# Patient Record
Sex: FEMALE | Race: WHITE | NOT HISPANIC OR LATINO | Employment: FULL TIME | URBAN - METROPOLITAN AREA
[De-identification: names, ages, dates, MRNs, and addresses within clinical notes are randomized per-mention and may not be internally consistent; named-entity substitution may affect disease eponyms.]

---

## 2022-01-05 ENCOUNTER — OFFICE VISIT (OUTPATIENT)
Dept: URGENT CARE | Facility: CLINIC | Age: 37
End: 2022-01-05
Payer: COMMERCIAL

## 2022-01-05 VITALS
WEIGHT: 190 LBS | SYSTOLIC BLOOD PRESSURE: 112 MMHG | RESPIRATION RATE: 16 BRPM | HEIGHT: 63 IN | TEMPERATURE: 97 F | DIASTOLIC BLOOD PRESSURE: 60 MMHG | BODY MASS INDEX: 33.66 KG/M2 | OXYGEN SATURATION: 100 % | HEART RATE: 69 BPM

## 2022-01-05 DIAGNOSIS — G44.219 EPISODIC TENSION-TYPE HEADACHE, NOT INTRACTABLE: ICD-10-CM

## 2022-01-05 DIAGNOSIS — M54.2 SORE NECK: Primary | ICD-10-CM

## 2022-01-05 LAB
CTP QC/QA: YES
MOLECULAR STREP A: NEGATIVE
POC MOLECULAR INFLUENZA A AGN: NEGATIVE
POC MOLECULAR INFLUENZA B AGN: NEGATIVE
SARS-COV-2 RDRP RESP QL NAA+PROBE: NEGATIVE

## 2022-01-05 PROCEDURE — 96372 THER/PROPH/DIAG INJ SC/IM: CPT | Mod: S$GLB,,, | Performed by: PHYSICIAN ASSISTANT

## 2022-01-05 PROCEDURE — 87651 POCT STREP A MOLECULAR: ICD-10-PCS | Mod: QW,S$GLB,, | Performed by: PHYSICIAN ASSISTANT

## 2022-01-05 PROCEDURE — 87651 STREP A DNA AMP PROBE: CPT | Mod: QW,S$GLB,, | Performed by: PHYSICIAN ASSISTANT

## 2022-01-05 PROCEDURE — 3078F PR MOST RECENT DIASTOLIC BLOOD PRESSURE < 80 MM HG: ICD-10-PCS | Mod: CPTII,S$GLB,, | Performed by: PHYSICIAN ASSISTANT

## 2022-01-05 PROCEDURE — 3078F DIAST BP <80 MM HG: CPT | Mod: CPTII,S$GLB,, | Performed by: PHYSICIAN ASSISTANT

## 2022-01-05 PROCEDURE — 3074F SYST BP LT 130 MM HG: CPT | Mod: CPTII,S$GLB,, | Performed by: PHYSICIAN ASSISTANT

## 2022-01-05 PROCEDURE — 99214 OFFICE O/P EST MOD 30 MIN: CPT | Mod: 25,S$GLB,, | Performed by: PHYSICIAN ASSISTANT

## 2022-01-05 PROCEDURE — 1160F RVW MEDS BY RX/DR IN RCRD: CPT | Mod: CPTII,S$GLB,, | Performed by: PHYSICIAN ASSISTANT

## 2022-01-05 PROCEDURE — 1160F PR REVIEW ALL MEDS BY PRESCRIBER/CLIN PHARMACIST DOCUMENTED: ICD-10-PCS | Mod: CPTII,S$GLB,, | Performed by: PHYSICIAN ASSISTANT

## 2022-01-05 PROCEDURE — U0002: ICD-10-PCS | Mod: QW,S$GLB,, | Performed by: PHYSICIAN ASSISTANT

## 2022-01-05 PROCEDURE — 1159F PR MEDICATION LIST DOCUMENTED IN MEDICAL RECORD: ICD-10-PCS | Mod: CPTII,S$GLB,, | Performed by: PHYSICIAN ASSISTANT

## 2022-01-05 PROCEDURE — 3008F BODY MASS INDEX DOCD: CPT | Mod: CPTII,S$GLB,, | Performed by: PHYSICIAN ASSISTANT

## 2022-01-05 PROCEDURE — 96372 PR INJECTION,THERAP/PROPH/DIAG2ST, IM OR SUBCUT: ICD-10-PCS | Mod: S$GLB,,, | Performed by: PHYSICIAN ASSISTANT

## 2022-01-05 PROCEDURE — 3008F PR BODY MASS INDEX (BMI) DOCUMENTED: ICD-10-PCS | Mod: CPTII,S$GLB,, | Performed by: PHYSICIAN ASSISTANT

## 2022-01-05 PROCEDURE — 1159F MED LIST DOCD IN RCRD: CPT | Mod: CPTII,S$GLB,, | Performed by: PHYSICIAN ASSISTANT

## 2022-01-05 PROCEDURE — 3074F PR MOST RECENT SYSTOLIC BLOOD PRESSURE < 130 MM HG: ICD-10-PCS | Mod: CPTII,S$GLB,, | Performed by: PHYSICIAN ASSISTANT

## 2022-01-05 PROCEDURE — 87502 INFLUENZA DNA AMP PROBE: CPT | Mod: QW,S$GLB,, | Performed by: PHYSICIAN ASSISTANT

## 2022-01-05 PROCEDURE — 87502 POCT INFLUENZA A/B MOLECULAR: ICD-10-PCS | Mod: QW,S$GLB,, | Performed by: PHYSICIAN ASSISTANT

## 2022-01-05 PROCEDURE — U0002 COVID-19 LAB TEST NON-CDC: HCPCS | Mod: QW,S$GLB,, | Performed by: PHYSICIAN ASSISTANT

## 2022-01-05 PROCEDURE — 99214 PR OFFICE/OUTPT VISIT, EST, LEVL IV, 30-39 MIN: ICD-10-PCS | Mod: 25,S$GLB,, | Performed by: PHYSICIAN ASSISTANT

## 2022-01-05 RX ORDER — KETOROLAC TROMETHAMINE 30 MG/ML
30 INJECTION, SOLUTION INTRAMUSCULAR; INTRAVENOUS
Status: COMPLETED | OUTPATIENT
Start: 2022-01-05 | End: 2022-01-05

## 2022-01-05 RX ORDER — ESZOPICLONE 3 MG/1
3 TABLET, FILM COATED ORAL DAILY
COMMUNITY
Start: 2021-12-21

## 2022-01-05 RX ORDER — BUSPIRONE HYDROCHLORIDE 7.5 MG/1
7.5 TABLET ORAL 2 TIMES DAILY PRN
COMMUNITY
Start: 2021-07-26

## 2022-01-05 RX ORDER — ASPIRIN 325 MG
50000 TABLET, DELAYED RELEASE (ENTERIC COATED) ORAL
COMMUNITY
Start: 2021-11-22

## 2022-01-05 RX ORDER — FOLIC ACID 1 MG/1
1000 TABLET ORAL DAILY
COMMUNITY
Start: 2021-12-21

## 2022-01-05 RX ORDER — CYCLOBENZAPRINE HCL 5 MG
5 TABLET ORAL 2 TIMES DAILY PRN
Qty: 14 TABLET | Refills: 0 | Status: SHIPPED | OUTPATIENT
Start: 2022-01-05 | End: 2022-01-12

## 2022-01-05 RX ORDER — BUPROPION HYDROCHLORIDE 150 MG/1
150 TABLET ORAL EVERY MORNING
COMMUNITY
Start: 2021-12-21

## 2022-01-05 RX ORDER — CETIRIZINE HYDROCHLORIDE 10 MG/1
10 TABLET ORAL
COMMUNITY

## 2022-01-05 RX ADMIN — KETOROLAC TROMETHAMINE 30 MG: 30 INJECTION, SOLUTION INTRAMUSCULAR; INTRAVENOUS at 10:01

## 2022-01-05 NOTE — PATIENT INSTRUCTIONS
Results for orders placed or performed in visit on 01/05/22   POCT COVID-19 Rapid Screening   Result Value Ref Range    POC Rapid COVID Negative Negative     Acceptable Yes    POCT Influenza A/B MOLECULAR   Result Value Ref Range    POC Molecular Influenza A Ag Negative Negative, Not Reported    POC Molecular Influenza B Ag Negative Negative, Not Reported     Acceptable Yes    POCT Strep A, Molecular   Result Value Ref Range    Molecular Strep A, POC Negative Negative     Acceptable Yes    Headache     - Drink plenty of fluids to remain hydrated.   - You can take Tylenol and Ibuprofen as needed for headache, as long as you don't have any allergies to these medications or medical conditions such as ulcers, liver or kidney disease or blood thinners etc that would prevent you from taking these meds.   - Get plenty rest.   - Slowly change from laying, sitting, and standing positions to help lightheadedness/dizziness.  - Sit or lie down immediately when dizziness/lightheaded to avoid further injury.      - If your condition worsens or fails to improve we recommend that you receive another evaluation at the ER immediately or contact your PCP to discuss your concerns or return here.  -  You must understand that you've received an urgent care treatment only and that you may be released before all your medical problems are known or treated. You the patient [facility] instructed.      - Watch for increase pain, fever, vomiting, neck pain or mental confusion.      Neck pain  Please avoid heavy lifting and strenuous exercise for the next several days.      Alternate heat and ice for first 48 hours then  apply heat. You may do gently stretching within your limits of pain.  *Moist warm compresss to area several times daily.  May use a heating pad on LOW to provide heat over a towel which was dampended with warm water.   DO NOT FALL ASLEEP WITH HEATING PAD ON.  Do  not stay in one position to long.  When sleeping on your back place a pillow under knees to reduce tension on back.    If sleeping on your side, place pillow between knees to keep spine in better alinement.    Wear supportive shoes such as tennis shoes for support of the lower back.  Take any medication as directed.      30 mg Toradol injection given in clinic today.  Patient denies any kidney, liver, or GI issues.    You have been prescribed Flexeril for muscle pain.  This medication causes drowsiness.  Do not drink alcohol or operate motor vehicles while taking.        If you were not prescribed an anti-inflammatory medication, and if you do not have any history of stomach/intestinal ulcers, or kidney disease, or are not taking a blood thinner such as Coumadin, Plavix, Pradaxa, Eloquis, or Xaralta for example, it is OK to take over the counter Ibuprofen or Advil or Motrin or Aleve as directed.  Do not take these medications on an empty stomach.      Please follow-up with your primary care provider if your symptoms continue.    Go to the emergency department for any new or worsening symptoms including but not limited to: loss of control of your bowel and/or bladder, sensation loss in between your legs by your genitalia and/or rectum.     OVER THE COUNTER RECOMMENDATIONS/SUGGESTIONS.--if needed  Remember to switch antihistamines every 3 months, if taken daily.     Make sure to stay well hydrated.    Use Nasal Saline to mechanically move any post nasal drip from your eustachian tube or from the back of your throat.    Use warm salt water gargles to ease your throat pain. Warm salt water gargles as needed for sore throat-  1/2 tsp salt to 1 cup warm water, gargle as desired.    Use an antihistamine such as Claritin, Zyrtec or Allegra to dry you out (NONDROWSY) or Benadryl (DROWSY).    Use pseudoephedrine (behind the counter) to decongest. Pseudoephedrine  30 mg up to 240 mg /day. *BE AWARE- It can raise your blood  pressure and give you palpitations.    Use mucinex (guaifenisin) to break up mucous up to 2400mg/day to loosen any mucous.   The mucinex DM pill has a cough suppressant that can be sedating. It can be used at night to stop the tickle at the back of your throat.  You can use Mucinex D (it has guaifenesin and a high dose of pseudoephedrine) in the mornings to help decongest.      Use Flonase 1-2 sprays/nostril per day. It is a local acting steroid nasal spray, if you develop a bloody nose, stop using the medication immediately.    Sometimes Nyquil at night is beneficial to help you get some rest, however it is sedating and it does have an antihistamine, and tylenol.    Honey is a natural cough suppressant that can be used.    Tylenol up to 4,000 mg a day is safe for short periods and can be used for headache, body aches, pain, and fever. However in high doses and prolonged use it can cause liver irritation.    Ibuprofen is a non-steroidal anti-inflammatory that can be used for headache, body aches, pain, and fever.However it can also cause stomach irritation if over used.      - You must understand that you have received an Urgent Care treatment only and that you may be released before all of your medical problems are known or treated.   - You, the patient, will arrange for follow up care as instructed with your primary care provider or recommended specialist.   - If your condition worsens or fails to improve we recommend that you receive another evaluation at the ER immediately or contact your PCP to discuss your concerns, or return here.   - Please do not drive or make any important decisions for 24 hours if you have received any pain medications, sedatives or mood altering drugs during your visit.    Disclaimer: This document was drafted with the use of a voice recognition device and is likely to have sound alike errors.   Patient Education       Generalized Neck Pain   About this topic   The neck or cervical spine  has 7 spinal bones that run from the base of your skull to the upper back. These spinal bones have discs in between them. Discs act as shock absorbers. Ligaments are strong bands of tissue that hold the bones together. Many muscles surround and attach on these bones. Nerves come off of the spinal cord and exit out of small spaces in between the spinal bones. If any of these structures get injured or damaged, neck pain can happen.       What are the causes?   · Problems with muscles, ligaments, and nerve like:  ? Muscle spasm or strain  ? Stretching or tearing of tissue that holds spinal bones together. This is a ligament sprain.  ? Nerve compression  · Problems with bones or discs in your neck like:  ? Discs weaken and collapse. This is disc degeneration.   ? Discs bulge or break open. This is a herniated disc.  ? Bone breaks  ? Trauma, direct blow, whiplash  ? Born with problems in the spinal bones  ? Decreased space where the nerves leave the spinal column. This is spinal stenosis.  · Other problems like:  ? Arthritis  ? Fibromyalgia  ? Infection  ? Tumors or cancer  What can make this more likely to happen?   Neck pain is more likely to happen if you have had a previous neck problem or have been in an accident. People who play contact sports are more likely to have neck pain. You are also more likely if you have poor posture. As your age increases, so does your chances for having disc problems, weak bones, or arthritis.  What are the main signs?   · Pain in the neck that may also run down the arm  · Neck stiffness  · Trouble moving the neck  · Muscle spasms  · Numbness or weakness in the arms  How does the doctor diagnose this health problem?   The doctor will feel around your neck. Your doctor will have you move your neck in different directions to check your motion. Your doctor may push or pull on your neck and arms to check your strength. Your doctor may also check for numbness in your arms. The doctor may  order:  · Lab tests  · X-rays  · CT or MRI scan  · Nerve conduction test  · Electromyelogram (EMG) ? to look at how well the nerves are working  · Spinal tap to check for infection  How does the doctor treat this health problem?   · Rest  · Ice  · Soft neck collar for a short time only. Wearing a neck collar too long can cause weakness in the neck muscles.  · Special pillow  · Heat may be used later but not right away. Heat can make swelling worse.  · Exercises for range of motion, stretching, and strengthening  · Massage  · Traction  · Surgery  What drugs may be needed?   The doctor may order drugs to:  · Help with pain and swelling  · Relax muscles  · Fight an infection  The doctor may give you a shot of an anti-inflammatory drug called a corticosteroid. This will help with swelling. Talk with your doctor about the risks of this shot.  What problems could happen?   · Infection  · Bleeding  · Injury to nerves, tendons, or blood vessels  · Ongoing pain  · Blood clots  · Numbness, tingling, or weakness in the arms or legs  · Arthritis  · Loss of bladder or bowel control  · Paralysis  What can be done to prevent this health problem?   · Always wear a seat belt. Drive safely. Obey speed limits. Do not drink and drive.  · Have headrests in the car at the right height. The middle of the headrest should be even with the upper parts of your ears.  · Use good posture. Do not slouch.  · Take breaks often when doing things that use repeat movements.  · If you have a desk job, make sure your computer is at eye level and that you have a supportive chair. Read papers at eye level.  · If you use the telephone often for your job, use a headset if possible. Do not hold the phone between your ear and shoulder.  · Stay active and work out to keep your muscles strong and flexible.  · Warm up slowly and stretch before you work out. Use good ways to train, such as slowly adding to how far you run. Do not work out if you are overly  tired. Take extra care if working out in cold weather.  · Wear the right equipment when playing sports.  · Always wear helmets for bikes and motorcycles.  Where can I learn more?   [employer]  https://www.betterhealth.[place].gov.[place]/health/ConditionsAndTreatments/neck-pain   NHS Choices  https://www.nhs.uk/conditions/neck-pain-and-stiff-neck/   Last Reviewed Date   2021-09-01  Consumer Information Use and Disclaimer   This information is not specific medical advice and does not replace information you receive from your health care provider. This is only a brief summary of general information. It does NOT include all information about conditions, illnesses, injuries, tests, procedures, treatments, therapies, discharge instructions or life-style choices that may apply to you. You must talk with your health care provider for complete information about your health and treatment options. This information should not be used to decide whether or not to accept your health care providers advice, instructions or recommendations. Only your health care provider has the knowledge and training to provide advice that is right for you.  Copyright   Copyright © 2021 [employer] and its affiliates and/or licensors. All rights reserved.

## 2022-01-05 NOTE — LETTER
[id number] [facility], [address]  [place] [address]  Phone: [phone]          Return to Work/School    Patient: Myra Howard  YOB: 1985   Date: 01/05/2022     To Whom It May Concern:     Myra Howard was in contact with/seen in my office on 01/05/2022. COVID-19 is present in our communities across the state. There is limited testing for COVID at this time, so not all patients can be tested. In this situation, your employee meets the following criteria:     Myra Howard has met the criteria for COVID-19 testing and has a NEGATIVE result.  She also tested negative for flu and strep today.  The employee can return to work once they are asymptomatic for 24 hours without the use of fever reducing medications (Tylenol, Motrin, etc).     If you have any questions or concerns, or if I can be of further assistance, please do not hesitate to contact me.     Sincerely,    Radha Romero PA-C

## 2022-01-05 NOTE — PROGRESS NOTES
"Subjective:       Patient ID: Myra Howard is a 36 y.o. female.    Vitals:  height is 5' 3" (1.6 m) and weight is 86.2 kg (190 lb). Her temperature is 97.2 °F (36.2 °C). Her blood pressure is 112/60 and her pulse is 69. Her respiration is 16 and oxygen saturation is 100%.     Chief Complaint: Neck Pain    Pt states pain at base of skull. Painful to open mouth with stiffness. She states that symptoms started Friday 12/31/2021.  Patient is requesting COVID, flu, and strep tests today.    Neck Pain   Associated symptoms include headaches. Pertinent negatives include no chest pain, numbness, photophobia or trouble swallowing.       Constitution: Negative for chills, fatigue, generalized weakness and international travel in last 60 days.   HENT: Negative for ear pain, tinnitus, drooling, tongue pain, facial swelling, congestion, sore throat, trouble swallowing and voice change.    Neck: Positive for neck pain. Negative for neck stiffness and neck swelling.   Cardiovascular: Negative for chest pain and palpitations.   Eyes: Negative for eye pain, photophobia and vision loss.   Respiratory: Negative for chest tightness, cough, shortness of breath and wheezing.    Gastrointestinal: Negative for abdominal pain, nausea, vomiting, constipation and diarrhea.   Genitourinary: Negative for dysuria and hematuria.   Musculoskeletal: Negative for pain and back pain.   Skin: Negative for rash, erythema and bruising.   Neurological: Positive for headaches. Negative for dizziness, light-headedness, speech difficulty, altered mental status, loss of consciousness, numbness and tingling.   Psychiatric/Behavioral: Negative for altered mental status.       Objective:       Vitals:    01/05/22 0913   BP: 112/60   Pulse: 69   Resp: 16   Temp: 97.2 °F (36.2 °C)   SpO2: 100%   Weight: 86.2 kg (190 lb)   Height: 5' 3" (1.6 m)       Physical Exam   Constitutional: She is oriented to person, place, and time. She appears well-developed. She " is cooperative.  Non-toxic appearance. She does not appear ill. No distress. awake  HENT:   Head: Normocephalic and atraumatic.   Ears:   Right Ear: Hearing, tympanic membrane, external ear and ear canal normal. No drainage, swelling or tenderness. Tympanic membrane is not erythematous and not bulging. No middle ear effusion. No decreased hearing is noted. impacted cerumen  Left Ear: Hearing, tympanic membrane, external ear and ear canal normal. No drainage, swelling or tenderness. Tympanic membrane is not erythematous and not bulging.  No middle ear effusion. No decreased hearing is noted. impacted cerumen  Nose: Congestion present. No mucosal edema, rhinorrhea, purulent discharge or nasal deformity. No epistaxis. Right sinus exhibits no maxillary sinus tenderness and no frontal sinus tenderness. Left sinus exhibits no maxillary sinus tenderness and no frontal sinus tenderness.   Mouth/Throat: Uvula is midline and mucous membranes are normal. Mucous membranes are moist. No oral lesions. No trismus in the jaw. Normal dentition. No uvula swelling. No oropharyngeal exudate, posterior oropharyngeal edema, posterior oropharyngeal erythema, tonsillar abscesses or cobblestoning. Tonsils are 0 on the right. Tonsils are 0 on the left. No tonsillar exudate. Oropharynx is clear.   Eyes: Conjunctivae and lids are normal. Pupils are equal, round, and reactive to light. No visual field deficit is present. Right eye exhibits no discharge. Left eye exhibits no discharge. No scleral icterus. Right eye exhibits no nystagmus. Left eye exhibits no nystagmus.      extraocular movement intact vision grossly intact gaze aligned appropriately periorbital hyperpigmentation   Neck: Trachea normal and phonation normal. Neck supple. Carotid bruit is not present. No Brudzinski's sign and no Kernig's sign noted. No thyroid mass and no thyromegaly present. No thyroid tenderness present. No torticollis present.     No edema present. No erythema  present. No neck rigidity present. No decreased range of motion present. No pain with movement present. No spinous process tenderness present. muscular tenderness present.   Cardiovascular: Normal rate, regular rhythm, S1 normal, S2 normal, normal heart sounds and normal pulses. Exam reveals no decreased pulses.   Pulmonary/Chest: Effort normal and breath sounds normal. No accessory muscle usage. No tachypnea. No respiratory distress. She has no decreased breath sounds. She has no wheezes. She has no rhonchi. She exhibits no tenderness, no crepitus and no swelling.   Abdominal: Normal appearance and bowel sounds are normal. She exhibits no distension, no pulsatile midline mass and no mass. Soft. There is no abdominal tenderness. There is no rebound, no guarding, no left CVA tenderness and no right CVA tenderness.   Musculoskeletal: Normal range of motion.         General: No swelling, deformity or signs of injury. Normal range of motion.      Cervical back: She exhibits tenderness.      Right lower leg: No edema.      Left lower leg: No edema.   Lymphadenopathy:     She has no cervical adenopathy.   Neurological: no focal deficit. She is alert, oriented to person, place, and time and at baseline. She has normal motor skills, normal sensation and intact cranial nerves. She displays no weakness and no dysarthria. No cranial nerve deficit. She exhibits normal muscle tone. Gait normal. Coordination and gait normal. GCS eye subscore is 4. GCS verbal subscore is 5. GCS motor subscore is 6.   Skin: Skin is warm, dry, intact, not diaphoretic, not pale and no rash. Capillary refill takes less than 2 seconds. No erythema and No lesion   Psychiatric: Her speech is normal and behavior is normal. Judgment and thought content normal.   Nursing note and vitals reviewed.        Assessment:       1. Sore neck    2. Episodic tension-type headache, not intractable        Results for orders placed or performed in visit on 01/05/22    POCT COVID-19 Rapid Screening   Result Value Ref Range    POC Rapid COVID Negative Negative     Acceptable Yes    POCT Influenza A/B MOLECULAR   Result Value Ref Range    POC Molecular Influenza A Ag Negative Negative, Not Reported    POC Molecular Influenza B Ag Negative Negative, Not Reported     Acceptable Yes    POCT Strep A, Molecular   Result Value Ref Range    Molecular Strep A, POC Negative Negative     Acceptable Yes        Plan:         Sore neck  -     POCT COVID-19 Rapid Screening  -     POCT Influenza A/B MOLECULAR  -     POCT Strep A, Molecular  -     ketorolac injection 30 mg  -     cyclobenzaprine (FLEXERIL) 5 MG tablet; Take 1 tablet (5 mg total) by mouth 2 (two) times daily as needed for Muscle spasms.  Dispense: 14 tablet; Refill: 0    Episodic tension-type headache, not intractable               Patient Instructions     Results for orders placed or performed in visit on 01/05/22   POCT COVID-19 Rapid Screening   Result Value Ref Range    POC Rapid COVID Negative Negative     Acceptable Yes    POCT Influenza A/B MOLECULAR   Result Value Ref Range    POC Molecular Influenza A Ag Negative Negative, Not Reported    POC Molecular Influenza B Ag Negative Negative, Not Reported     Acceptable Yes    POCT Strep A, Molecular   Result Value Ref Range    Molecular Strep A, POC Negative Negative     Acceptable Yes    Headache     - Drink plenty of fluids to remain hydrated.   - You can take Tylenol and Ibuprofen as needed for headache, as long as you don't have any allergies to these medications or medical conditions such as ulcers, liver or kidney disease or blood thinners etc that would prevent you from taking these meds.   - Get plenty rest.   - Slowly change from laying, sitting, and standing positions to help lightheadedness/dizziness.  - Sit or lie down immediately when dizziness/lightheaded to avoid further  injury.      - If your condition worsens or fails to improve we recommend that you receive another evaluation at the ER immediately or contact your PCP to discuss your concerns or return here.  -  You must understand that you've received an urgent care treatment only and that you may be released before all your medical problems are known or treated. You the patient will arrange for follouwp care as instructed.      - Watch for increase pain, fever, vomiting, neck pain or mental confusion.      Neck pain  Please avoid heavy lifting and strenuous exercise for the next several days.      Alternate heat and ice for first 48 hours then  apply heat. You may do gently stretching within your limits of pain.  *Moist warm compresss to area several times daily.  May use a heating pad on LOW to provide heat over a towel which was dampended with warm water.   DO NOT FALL ASLEEP WITH HEATING PAD ON.  Do not stay in one position to long.  When sleeping on your back place a pillow under knees to reduce tension on back.    If sleeping on your side, place pillow between knees to keep spine in better alinement.    Wear supportive shoes such as tennis shoes for support of the lower back.  Take any medication as directed.      30 mg Toradol injection given in clinic today.  Patient denies any kidney, liver, or GI issues.    You have been prescribed Flexeril for muscle pain.  This medication causes drowsiness.  Do not drink alcohol or operate motor vehicles while taking.        If you were not prescribed an anti-inflammatory medication, and if you do not have any history of stomach/intestinal ulcers, or kidney disease, or are not taking a blood thinner such as Coumadin, Plavix, Pradaxa, Eloquis, or Xaralta for example, it is OK to take over the counter Ibuprofen or Advil or Motrin or Aleve as directed.  Do not take these medications on an empty stomach.      Please follow-up with your primary care provider if your symptoms  continue.    Go to the emergency department for any new or worsening symptoms including but not limited to: loss of control of your bowel and/or bladder, sensation loss in between your legs by your genitalia and/or rectum.     OVER THE COUNTER RECOMMENDATIONS/SUGGESTIONS.--if needed  Remember to switch antihistamines every 3 months, if taken daily.     Make sure to stay well hydrated.    Use Nasal Saline to mechanically move any post nasal drip from your eustachian tube or from the back of your throat.    Use warm salt water gargles to ease your throat pain. Warm salt water gargles as needed for sore throat-  1/2 tsp salt to 1 cup warm water, gargle as desired.    Use an antihistamine such as Claritin, Zyrtec or Allegra to dry you out (NONDROWSY) or Benadryl (DROWSY).    Use pseudoephedrine (behind the counter) to decongest. Pseudoephedrine  30 mg up to 240 mg /day. *BE AWARE- It can raise your blood pressure and give you palpitations.    Use mucinex (guaifenisin) to break up mucous up to 2400mg/day to loosen any mucous.   The mucinex DM pill has a cough suppressant that can be sedating. It can be used at night to stop the tickle at the back of your throat.  You can use Mucinex D (it has guaifenesin and a high dose of pseudoephedrine) in the mornings to help decongest.      Use Flonase 1-2 sprays/nostril per day. It is a local acting steroid nasal spray, if you develop a bloody nose, stop using the medication immediately.    Sometimes Nyquil at night is beneficial to help you get some rest, however it is sedating and it does have an antihistamine, and tylenol.    Honey is a natural cough suppressant that can be used.    Tylenol up to 4,000 mg a day is safe for short periods and can be used for headache, body aches, pain, and fever. However in high doses and prolonged use it can cause liver irritation.    Ibuprofen is a non-steroidal anti-inflammatory that can be used for headache, body aches, pain, and fever.However  it can also cause stomach irritation if over used.      - You must understand that you have received an Urgent Care treatment only and that you may be released before all of your medical problems are known or treated.   - You, the patient, will arrange for follow up care as instructed with your primary care provider or recommended specialist.   - If your condition worsens or fails to improve we recommend that you receive another evaluation at the ER immediately or contact your PCP to discuss your concerns, or return here.   - Please do not drive or make any important decisions for 24 hours if you have received any pain medications, sedatives or mood altering drugs during your visit.    Disclaimer: This document was drafted with the use of a voice recognition device and is likely to have sound alike errors.   Patient Education       Generalized Neck Pain   About this topic   The neck or cervical spine has 7 spinal bones that run from the base of your skull to the upper back. These spinal bones have discs in between them. Discs act as shock absorbers. Ligaments are strong bands of tissue that hold the bones together. Many muscles surround and attach on these bones. Nerves come off of the spinal cord and exit out of small spaces in between the spinal bones. If any of these structures get injured or damaged, neck pain can happen.       What are the causes?   · Problems with muscles, ligaments, and nerve like:  ? Muscle spasm or strain  ? Stretching or tearing of tissue that holds spinal bones together. This is a ligament sprain.  ? Nerve compression  · Problems with bones or discs in your neck like:  ? Discs weaken and collapse. This is disc degeneration.   ? Discs bulge or break open. This is a herniated disc.  ? Bone breaks  ? Trauma, direct blow, whiplash  ? Born with problems in the spinal bones  ? Decreased space where the nerves leave the spinal column. This is spinal stenosis.  · Other problems  like:  ? Arthritis  ? Fibromyalgia  ? Infection  ? Tumors or cancer  What can make this more likely to happen?   Neck pain is more likely to happen if you have had a previous neck problem or have been in an accident. People who play contact sports are more likely to have neck pain. You are also more likely if you have poor posture. As your age increases, so does your chances for having disc problems, weak bones, or arthritis.  What are the main signs?   · Pain in the neck that may also run down the arm  · Neck stiffness  · Trouble moving the neck  · Muscle spasms  · Numbness or weakness in the arms  How does the doctor diagnose this health problem?   The doctor will feel around your neck. Your doctor will have you move your neck in different directions to check your motion. Your doctor may push or pull on your neck and arms to check your strength. Your doctor may also check for numbness in your arms. The doctor may order:  · Lab tests  · X-rays  · CT or MRI scan  · Nerve conduction test  · Electromyelogram (EMG) ? to look at how well the nerves are working  · Spinal tap to check for infection  How does the doctor treat this health problem?   · Rest  · Ice  · Soft neck collar for a short time only. Wearing a neck collar too long can cause weakness in the neck muscles.  · Special pillow  · Heat may be used later but not right away. Heat can make swelling worse.  · Exercises for range of motion, stretching, and strengthening  · Massage  · Traction  · Surgery  What drugs may be needed?   The doctor may order drugs to:  · Help with pain and swelling  · Relax muscles  · Fight an infection  The doctor may give you a shot of an anti-inflammatory drug called a corticosteroid. This will help with swelling. Talk with your doctor about the risks of this shot.  What problems could happen?   · Infection  · Bleeding  · Injury to nerves, tendons, or blood vessels  · Ongoing pain  · Blood clots  · Numbness, tingling, or weakness in  the arms or legs  · Arthritis  · Loss of bladder or bowel control  · Paralysis  What can be done to prevent this health problem?   · Always wear a seat belt. Drive safely. Obey speed limits. Do not drink and drive.  · Have headrests in the car at the right height. The middle of the headrest should be even with the upper parts of your ears.  · Use good posture. Do not slouch.  · Take breaks often when doing things that use repeat movements.  · If you have a desk job, make sure your computer is at eye level and that you have a supportive chair. Read papers at eye level.  · If you use the telephone often for your job, use a headset if possible. Do not hold the phone between your ear and shoulder.  · Stay active and work out to keep your muscles strong and flexible.  · Warm up slowly and stretch before you work out. Use good ways to train, such as slowly adding to how far you run. Do not work out if you are overly tired. Take extra care if working out in cold weather.  · Wear the right equipment when playing sports.  · Always wear helmets for bikes and motorcycles.  Where can I learn more?   [employer]  https://www.betterhealth.[place]/health/ConditionsAndTreatments/neck-pain   [facility] Choices  https://www.nhs.[place]/conditions/neck-pain-and-stiff-neck/   Last Reviewed Date   2021-09-01  Consumer Information Use and Disclaimer   This information is not specific medical advice and does not replace information you receive from your health care provider. This is only a brief summary of general information. It does NOT include all information about conditions, illnesses, injuries, tests, procedures, treatments, therapies, discharge instructions or life-style choices that may apply to you. You must talk with your health care provider for complete information about your health and treatment options. This information should not be used to decide whether or not to accept your health care providers advice, instructions or  recommendations. Only your health care provider has the knowledge and training to provide advice that is right for you.  Copyright   Copyright © 2021 [employer] and its affiliates and/or licensors. All rights reserved.

## 2022-01-05 NOTE — PROGRESS NOTES
Subjective:       Patient ID: Myra Howard is a 36 y.o. female.    Vitals:  vitals were not taken for this visit.     Chief Complaint: Neck Pain    Neck Pain   This is a new problem. The current episode started in the past 7 days. The problem occurs constantly. The problem has been unchanged. The pain is associated with an unknown factor. The pain is present in the right side and left side. The quality of the pain is described as stabbing. The pain is at a severity of 7/10. The pain is moderate. The symptoms are aggravated by position and twisting. The pain is same all the time. Stiffness is present all day. She has tried nothing for the symptoms. The treatment provided no relief.       Neck: Positive for neck pain.       Objective:      Physical Exam      Assessment:       No diagnosis found.      Plan:         There are no diagnoses linked to this encounter.